# Patient Record
Sex: MALE | Race: WHITE | NOT HISPANIC OR LATINO | Employment: FULL TIME | URBAN - NONMETROPOLITAN AREA
[De-identification: names, ages, dates, MRNs, and addresses within clinical notes are randomized per-mention and may not be internally consistent; named-entity substitution may affect disease eponyms.]

---

## 2023-11-01 ENCOUNTER — NON-PROVIDER VISIT (OUTPATIENT)
Dept: URGENT CARE | Facility: PHYSICIAN GROUP | Age: 58
End: 2023-11-01

## 2023-11-01 DIAGNOSIS — Z02.1 PRE-EMPLOYMENT DRUG SCREENING: ICD-10-CM

## 2023-11-01 DIAGNOSIS — Z02.83 ENCOUNTER FOR DRUG SCREENING: ICD-10-CM

## 2023-11-01 LAB
AMP AMPHETAMINE: NORMAL
BREATH ALCOHOL COMMENT: NORMAL
COC COCAINE: NORMAL
INT CON NEG: NORMAL
INT CON POS: NORMAL
MET METHAMPHETAMINES: NORMAL
OPI OPIATES: NORMAL
PCP PHENCYCLIDINE: NORMAL
POC BREATHALIZER: NEGATIVE PERCENT (ref 0–0.01)
POC DRUG COMMENT 753798-OCCUPATIONAL HEALTH: NEGATIVE
THC: NORMAL

## 2023-11-01 PROCEDURE — 82075 ASSAY OF BREATH ETHANOL: CPT | Performed by: NURSE PRACTITIONER

## 2023-11-01 PROCEDURE — 80305 DRUG TEST PRSMV DIR OPT OBS: CPT | Performed by: NURSE PRACTITIONER

## 2024-01-31 ENCOUNTER — OCCUPATIONAL MEDICINE (OUTPATIENT)
Dept: URGENT CARE | Facility: PHYSICIAN GROUP | Age: 59
End: 2024-01-31
Payer: COMMERCIAL

## 2024-01-31 VITALS
WEIGHT: 203 LBS | BODY MASS INDEX: 32.62 KG/M2 | SYSTOLIC BLOOD PRESSURE: 102 MMHG | HEIGHT: 66 IN | TEMPERATURE: 97.9 F | RESPIRATION RATE: 16 BRPM | DIASTOLIC BLOOD PRESSURE: 62 MMHG | HEART RATE: 76 BPM | OXYGEN SATURATION: 96 %

## 2024-01-31 DIAGNOSIS — S62.665D OPEN NONDISPLACED FRACTURE OF DISTAL PHALANX OF LEFT RING FINGER WITH ROUTINE HEALING, SUBSEQUENT ENCOUNTER: ICD-10-CM

## 2024-01-31 DIAGNOSIS — S67.22XA CRUSHING INJURY OF LEFT HAND, INITIAL ENCOUNTER: ICD-10-CM

## 2024-01-31 DIAGNOSIS — S62.661B OPEN NONDISPLACED FRACTURE OF DISTAL PHALANX OF LEFT INDEX FINGER, INITIAL ENCOUNTER: ICD-10-CM

## 2024-01-31 DIAGNOSIS — S62.663D OPEN NONDISPLACED FRACTURE OF DISTAL PHALANX OF LEFT MIDDLE FINGER WITH ROUTINE HEALING, SUBSEQUENT ENCOUNTER: ICD-10-CM

## 2024-01-31 PROCEDURE — 3074F SYST BP LT 130 MM HG: CPT | Performed by: NURSE PRACTITIONER

## 2024-01-31 PROCEDURE — 3078F DIAST BP <80 MM HG: CPT | Performed by: NURSE PRACTITIONER

## 2024-01-31 PROCEDURE — 99204 OFFICE O/P NEW MOD 45 MIN: CPT | Performed by: NURSE PRACTITIONER

## 2024-01-31 RX ORDER — CEPHALEXIN 500 MG/1
CAPSULE ORAL
COMMUNITY
Start: 2024-01-29

## 2024-01-31 RX ORDER — NAPROXEN 500 MG/1
TABLET ORAL
COMMUNITY
Start: 2024-01-29

## 2024-01-31 RX ORDER — SULFAMETHOXAZOLE AND TRIMETHOPRIM 800; 160 MG/1; MG/1
1 TABLET ORAL 2 TIMES DAILY
COMMUNITY
Start: 2024-01-29 | End: 2024-02-05

## 2024-01-31 RX ORDER — LISINOPRIL 10 MG/1
10 TABLET ORAL
COMMUNITY
Start: 2023-12-22

## 2024-01-31 RX ORDER — LEVOTHYROXINE SODIUM 0.03 MG/1
25 TABLET ORAL
COMMUNITY
Start: 2023-12-24

## 2024-01-31 NOTE — LETTER
[facility]  [place] [address]  Phone:  [phone] - Fax:  [phone]   [facility] Progress Report and Disability Certification  Date of Service: 1/31/2024   No Show:  No  Date / Time of Next Visit: 2/7/2024   Claim Information   Patient Name: Spenser Chong  Claim Number:     Employer: [employer]  Date of Injury: 1/29/2024     Insurer / TPA: [employer]  ID / SSN:     Occupation: [employer]  Diagnosis: Diagnoses of Open nondisplaced fracture of distal phalanx of left index finger, initial encounter, Open nondisplaced fracture of distal phalanx of left middle finger with routine healing, subsequent encounter, Open nondisplaced fracture of distal phalanx of left ring finger with routine healing, subsequent encounter, and Crushing injury of left hand, initial encounter were pertinent to this visit.    Medical Information   Related to Industrial Injury? Yes    Subjective Complaints:  DOI: 01/29/2024 YESSENIA: GOT HAND IN HYDRAULIC PRESS CRUSHED FRINGERS   Visit #2: Patient was initially seen at the [facility] due to crushing injury of left hand including first through third finger.  X-rays were obtained and patient does have multiple fractures to his fingertips open wounds.  Sutures were placed at the [facility].  Referral placed to orthopedics however patient needed to be seen by occupational medicine with new referral.  Patient was discharged home on Bactrim, Keflex, and naproxen.  Patient has been tolerating medications well.  He has not needed any naproxen since the first day.  He states that his tetanus was updated at the hospital.  States his pain is mild to moderate.  He has had some sharp shooting pains in his middle finger along with intermittent numbness.  He is doing daily bandage changes.  Denies any purulent discharge.  Has had small amount of bloody fluid and yellow fluid intermittently.  He is  right-hand dominant.  Denies any previous injury or surgeries to his left hand.  Patient is not been able to go back to work on current work restrictions.     Objective Findings: Left hand:   Index finger: Crushing injury noted at distal end, 3 sutures in place around nailbed.  Hematoma under nailbed. multiple bruising and hematomas present.  Positive for swelling.  Distally neurovascular intact.  Small amount of oozing bloody fluid.  Middle finger: Crushing injury noted at distal finger.  Hematoma under nailbed.  Large hematoma on palmar side of finger, no pain with palpation.  Multiple bruising and hematomas present.  Positive for swelling.  Distally neurovascular intact.  Small amount of oozing bloody fluid.  Ring finger:Crushing injury noted at distal end, 2 sutures in place around nailbed.  Hematoma under nailbed. multiple bruising and hematomas present.  Positive for swelling.  Distally neurovascular intact.  Small amount of oozing bloody fluid.   Pre-Existing Condition(s):     Assessment:   Condition Improved    Status: Discharged / Care Transfer  Permanent Disability:No    Plan:      Diagnostics:      Comments:       Disability Information   Status: Released to Restricted Duty    From:  1/31/2024  Through: 2/7/2024 Restrictions are: Temporary   Physical Restrictions   Sitting:    Standing:    Stooping:    Bending:      Squatting:    Walking:    Climbing:    Pushing:      Pulling:    Other:    Reaching Above Shoulder (L):   Reaching Above Shoulder (R):       Reaching Below Shoulder (L):    Reaching Below Shoulder (R):      Not to exceed Weight Limits   Carrying(hrs):   Weight Limit(lb):   Lifting(hrs):   Weight  Limit(lb):     Comments: No use of left hand.  Referral placed and transfer of care to orthopedics, stat order placed.  Wounds do not appear to be infected.  Patient will continue on Keflex and Bactrim.  Recommended cryotherapy 2-3 times daily as needed to help with pain and swelling.  Patient may use  naproxen as needed to help with pain.  Elevate to help with throbbing.  I have personally reviewed [facility]'s see form, D39 form, and reviewed x-ray on patient's phone.  Education was provided about the importance of daily bandage changes for the next 5 to 7 days then may keep open to air and use bandages to help with padding due to fractures.  Keep the areas clean and dry.    Repetitive Actions   Hands: i.e. Fine Manipulations from Grasping:     Feet: i.e. Operating Foot Controls:     Driving / Operate Machinery:     Health Care Provider’s Original or Electronic Signature  JONNY Leo. Health Care Provider’s Original or Electronic Signature    Buddy Horton DO MPH     Clinic Name / Location: [address] [place] [address] Clinic Phone Number: Dept: [phone]   Appointment Time: 4:30 Pm Visit Start Time: 5:17 PM   Check-In Time:  4:41 Pm Visit Discharge Time:  6:09 PM    Original-Treating Physician or Chiropractor    Page 2-Insurer/TPA    Page 3-Employer    Page 4-Employee

## 2024-02-01 NOTE — PROGRESS NOTES
"Subjective:     Spenser Chong is a 58 y.o. male who presents for Hand Injury (Put hand in Hydronic press and crushed his fingers,L hand index  middle and ring  still having swelling on Middle finger, still having pain, tender finger tips, )      DOI: 01/29/2024 YESSENIA: GOT HAND IN HYDRAULIC PRESS CRUSHED FRINGERS   Visit #2: Patient was initially seen at the [facility] due to crushing injury of left hand including first through third finger.  X-rays were obtained and patient does have multiple fractures to his fingertips open wounds.  Sutures were placed at the [facility].  Referral placed to orthopedics however patient needed to be seen by occupational medicine with new referral.  Patient was discharged home on Bactrim, Keflex, and naproxen.  Patient has been tolerating medications well.  He has not needed any naproxen since the first day.  He states that his tetanus was updated at the hospital.  States his pain is mild to moderate.  He has had some sharp shooting pains in his middle finger along with intermittent numbness.  He is doing daily bandage changes.  Denies any purulent discharge.  Has had small amount of bloody fluid and yellow fluid intermittently.  He is right-hand dominant.  Denies any previous injury or surgeries to his left hand.  Patient is not been able to go back to work on current work restrictions.      PMH:   No pertinent past medical history to this problem  MEDS:  Medications were reviewed in EMR  ALLERGIES:  Allergies were reviewed in EMR  SOCHX:  Works as a  [employer]   FH:   No pertinent family history to this problem       Objective:     /62   Pulse 76   Temp 36.6 °C (97.9 °F) (Temporal)   Resp 16   Ht 1.676 m (5' 6\")   Wt 92.1 kg (203 lb)   SpO2 96%   BMI 32.77 kg/m²     Left hand:   Index finger: Crushing injury noted at distal end, 3 sutures in place around nailbed.  Hematoma under nailbed. multiple bruising and hematomas present.  Positive for swelling.  " Distally neurovascular intact.  Small amount of oozing bloody fluid.  Middle finger: Crushing injury noted at distal finger.  Hematoma under nailbed.  Large hematoma on palmar side of finger, no pain with palpation.  Multiple bruising and hematomas present.  Positive for swelling.  Distally neurovascular intact.  Small amount of oozing bloody fluid.  Ring finger:Crushing injury noted at distal end, 2 sutures in place around nailbed.  Hematoma under nailbed. multiple bruising and hematomas present.  Positive for swelling.  Distally neurovascular intact.  Small amount of oozing bloody fluid.    Assessment/Plan:       1. Open nondisplaced fracture of distal phalanx of left index finger, initial encounter  - Referral to Orthopedics    2. Open nondisplaced fracture of distal phalanx of left middle finger with routine healing, subsequent encounter  - Referral to Orthopedics    3. Open nondisplaced fracture of distal phalanx of left ring finger with routine healing, subsequent encounter  - Referral to Orthopedics    4. Crushing injury of left hand, initial encounter  - Referral to Orthopedics    Other orders  - cephALEXin (KEFLEX) 500 MG Cap; TAKE 1 CAPSULE BY MOUTH TWICE A DAY FOR 7 DAYS  - lisinopril (PRINIVIL) 10 MG Tab; Take 10 mg by mouth every day.  - levothyroxine (SYNTHROID) 25 MCG Tab; Take 25 mcg by mouth every day.  - naproxen (NAPROSYN) 500 MG Tab; TAKE 1 TABLET BY MOUTH TWICE DAILY FOR 7 DAYS AS NEEDED FOR PAIN  - sulfamethoxazole-trimethoprim (BACTRIM DS) 800-160 MG tablet; Take 1 Tablet by mouth 2 times a day.    Released to Restricted Duty FROM 1/31/2024 TO 2/7/2024  No use of left hand.  Referral placed and transfer of care to orthopedics, stat order placed.  Wounds do not appear to be infected.  Patient will continue on Keflex and Bactrim.  Recommended cryotherapy 2-3 times daily as needed to help with pain and swelling.  Patient may use naproxen as needed to help with pain.  Elevate to help with  throbbing.  I have personally reviewed [facility]'s see form, D39 form, and reviewed x-ray on patient's phone.  Education was provided about the importance of daily bandage changes for the next 5 to 7 days then may keep open to air and use bandages to help with padding due to fractures.  Keep the areas clean and dry.       Differential diagnosis, natural history, supportive care, and indications for immediate follow-up discussed.